# Patient Record
Sex: MALE | Race: WHITE | Employment: FULL TIME | URBAN - METROPOLITAN AREA
[De-identification: names, ages, dates, MRNs, and addresses within clinical notes are randomized per-mention and may not be internally consistent; named-entity substitution may affect disease eponyms.]

---

## 2022-08-21 PROBLEM — N50.812 PAIN IN LEFT TESTICLE: Status: ACTIVE | Noted: 2022-08-21

## 2022-08-24 PROBLEM — N50.82 SCROTAL PAIN: Status: ACTIVE | Noted: 2022-08-24

## 2022-10-09 ENCOUNTER — E-VISIT (OUTPATIENT)
Dept: PRIMARY CARE CLINIC | Age: 37
End: 2022-10-09
Payer: COMMERCIAL

## 2022-10-09 DIAGNOSIS — U07.1 COVID: Primary | ICD-10-CM

## 2022-10-09 PROCEDURE — 99422 OL DIG E/M SVC 11-20 MIN: CPT | Performed by: NURSE PRACTITIONER

## 2022-10-09 RX ORDER — NIRMATRELVIR AND RITONAVIR 300-100 MG
KIT ORAL
Qty: 30 TABLET | Refills: 0 | Status: SHIPPED | OUTPATIENT
Start: 2022-10-09 | End: 2022-10-14

## 2022-10-09 ASSESSMENT — LIFESTYLE VARIABLES: SMOKING_STATUS: NO, I HAVE NEVER SMOKED

## 2022-10-09 NOTE — PROGRESS NOTES
Reviewed questionnaire     Reviewed meds/allergies    Dx Covid    Plan Rx given for paxlovid, follow up with PCP if no improvement    Time spent on visit 11 min